# Patient Record
Sex: FEMALE | Race: WHITE | NOT HISPANIC OR LATINO | URBAN - METROPOLITAN AREA
[De-identification: names, ages, dates, MRNs, and addresses within clinical notes are randomized per-mention and may not be internally consistent; named-entity substitution may affect disease eponyms.]

---

## 2020-08-10 ENCOUNTER — LAB OUTSIDE AN ENCOUNTER (OUTPATIENT)
Dept: URBAN - METROPOLITAN AREA CLINIC 23 | Facility: CLINIC | Age: 45
End: 2020-08-10

## 2020-08-10 ENCOUNTER — OFFICE VISIT (OUTPATIENT)
Dept: URBAN - METROPOLITAN AREA CLINIC 23 | Facility: CLINIC | Age: 45
End: 2020-08-10
Payer: MEDICARE

## 2020-08-10 DIAGNOSIS — K50.812 CROHN'S DISEASE OF BOTH SMALL AND LARGE INTESTINE WITH INTESTINAL OBSTRUCTION: ICD-10-CM

## 2020-08-10 PROCEDURE — G9622 NO UNHEAL ETOH USER: HCPCS | Performed by: INTERNAL MEDICINE

## 2020-08-10 PROCEDURE — 99214 OFFICE O/P EST MOD 30 MIN: CPT | Performed by: INTERNAL MEDICINE

## 2020-08-10 PROCEDURE — G9903 PT SCRN TBCO ID AS NON USER: HCPCS | Performed by: INTERNAL MEDICINE

## 2020-08-10 PROCEDURE — G8420 CALC BMI NORM PARAMETERS: HCPCS | Performed by: INTERNAL MEDICINE

## 2020-08-10 PROCEDURE — 3017F COLORECTAL CA SCREEN DOC REV: CPT | Performed by: INTERNAL MEDICINE

## 2020-08-10 PROCEDURE — G8427 DOCREV CUR MEDS BY ELIG CLIN: HCPCS | Performed by: INTERNAL MEDICINE

## 2020-08-10 PROCEDURE — 1036F TOBACCO NON-USER: CPT | Performed by: INTERNAL MEDICINE

## 2020-08-10 RX ORDER — VARENICLINE TARTRATE 1 MG/1
TAKE 1 TABLET (1 MG) WITH GLASS OF WATER BY ORAL ROUTE 2 TIMES PER DAY AFTER MEALS TABLET, FILM COATED ORAL 2
Qty: 0 | Refills: 0 | Status: ACTIVE | COMMUNITY
Start: 1900-01-01 | End: 1900-01-01

## 2020-08-10 RX ORDER — FLUTICASONE PROPIONATE 50 UG/1
SPRAY 1 SPRAY (50 MCG) IN EACH NOSTRIL BY INTRANASAL ROUTE ONCE DAILY SPRAY, METERED NASAL 1
Qty: 1 | Refills: 0 | Status: ACTIVE | COMMUNITY
Start: 1900-01-01 | End: 1900-01-01

## 2020-08-10 RX ORDER — METRONIDAZOLE 500 MG/1
1 TABLET TABLET, FILM COATED ORAL THREE TIMES A DAY
Qty: 30 | OUTPATIENT
Start: 2020-08-10 | End: 2020-08-20

## 2020-08-10 RX ORDER — SULFASALAZINE 500 MG/1
TAKE 4 TABLETS (2000 MG) BY ORAL ROUTE 2 TIMES PER DAY AFTER MEALS TABLET ORAL
Qty: 240 | Refills: 6 | Status: ACTIVE | COMMUNITY
Start: 2019-01-28 | End: 1900-01-01

## 2020-08-10 RX ORDER — PREDNISONE 20 MG/1
TAKE 2 TABLETS (40 MG) BY ORAL ROUTE ONCE DAILY TABLET ORAL 1
Qty: 60 | Refills: 0 | Status: ACTIVE | COMMUNITY
Start: 2019-04-26 | End: 1900-01-01

## 2020-08-10 RX ORDER — CIPROFLOXACIN HCL 500 MG
1 TABLET TABLET ORAL
Qty: 20 | OUTPATIENT
Start: 2020-08-10 | End: 2020-08-20

## 2020-08-10 RX ORDER — LAMOTRIGINE 100 MG/1
TAKE 1 TABLET (100 MG) BY ORAL ROUTE ONCE DAILY TABLET ORAL 1
Qty: 0 | Refills: 0 | Status: ACTIVE | COMMUNITY
Start: 1900-01-01 | End: 1900-01-01

## 2020-08-10 RX ORDER — PANTOPRAZOLE SODIUM 40 MG/1
TAKE 1 TABLET (40 MG) BY ORAL ROUTE 2 TIMES PER DAY, 30 MINUTES BEFORE BREAKFAST AND DINNER TABLET, DELAYED RELEASE ORAL 2
Qty: 60 | Refills: 11 | Status: ACTIVE | COMMUNITY
Start: 2019-05-24 | End: 1900-01-01

## 2020-08-10 RX ORDER — CYCLOBENZAPRINE HYDROCHLORIDE 10 MG/1
TAKE 1 TABLET (10 MG) BY ORAL ROUTE 2 TIMES PER DAY TABLET, FILM COATED ORAL 2
Qty: 0 | Refills: 0 | Status: ACTIVE | COMMUNITY
Start: 1900-01-01 | End: 1900-01-01

## 2020-08-10 RX ORDER — ONDANSETRON HYDROCHLORIDE 4 MG/1
1 TABLET TABLET, FILM COATED ORAL
Qty: 30 | Refills: 1 | OUTPATIENT
Start: 2020-08-10

## 2020-08-10 RX ORDER — PAROXETINE HYDROCHLORIDE 30 MG/1
TAKE 1 TABLET (30 MG) BY ORAL ROUTE ONCE DAILY TABLET, FILM COATED ORAL 1
Qty: 0 | Refills: 0 | Status: ACTIVE | COMMUNITY
Start: 1900-01-01 | End: 1900-01-01

## 2020-08-10 NOTE — HPI-TODAY'S VISIT:
45-year-old  female with Crohn's disease, involving ileocolonic, complicated by stenosis, status post ileocolonic resection 2 years ago.  Currently not on any medical therapy.  She is blaming insurance coverage issues.  She presents today  with 1 month history of acute flare.  Multiple loose diarrhea, nausea vomiting, right-sided abdominal pain.

## 2020-08-10 NOTE — PREVIOUS WORKUP REVIEWED
:ENDOSCOPIES:-EGD 1/23/2019:Normal EGD.-Colonoscopy 1/23/2019:Pseudopolyps in the cecum. Inflammation in the ascending colon and the cecum consistent with Crohn's disease. Mild in severity. Scarring from Crohn's disease at the ileocecal valve. Precluded intubation of the terminal ileum. A 6 mm polyp in the transverse colon. Hemorrhoids.*Pathology:Duodenum-increased intraepithelial lymphocytes and mild villous blunting. Gastric random-mild chronic gastritis without H. pylori. Esophagus-mild chronic inflammation in the squamous epithelium. No intestinal metaplasia. No eosinophils. Transverse colon polyp-lymphoid aggregates. Cecum-lymphoid aggregates without evidence of active, chronic or microscopic colitis. No granulomata or dysplasia. Right colon-minimal active inflammation, compatible with history of Crohn's disease, no granulomata or dysplasia. Transverse colon-normal. No evidence of active chronic or microscopic colitis. No granulomata or dysplasia. Left colon-normal. No evidence of active, chronic or microscopic colitis. No greater matter or dysplasia. Rectum-normal. No evidence of active, chronic or microscopic colitis. No granulomata or dysplasia.LABS: IMAGES:-MRI pelvis 4/3/2019:Abnormal thickening of the terminal ileum without evidence of acute inflammation. Mild dilation of the distal ileum and evidence of small bowel stasis raising concern for stricture.

## 2020-08-12 LAB
A/G RATIO: 1.4
ALBUMIN: 3.4
ALKALINE PHOSPHATASE: 119
ALT (SGPT): 23
AST (SGOT): 20
BILIRUBIN, TOTAL: 0.3
BUN/CREATININE RATIO: 8
BUN: 6
C-REACTIVE PROTEIN, QUANT: 232
CALCIUM: 9.2
CARBON DIOXIDE, TOTAL: 22
CHLORIDE: 97
CREATININE: 0.77
EGFR IF AFRICN AM: 108
EGFR IF NONAFRICN AM: 94
GLOBULIN, TOTAL: 2.4
GLUCOSE: 132
HEMATOCRIT: 40.1
HEMOGLOBIN: 12.5
LIPASE: 9
MCH: 28.7
MCHC: 31.2
MCV: 92
NRBC: (no result)
PLATELETS: 360
POTASSIUM: 3.9
PROTEIN, TOTAL: 5.8
RBC: 4.36
RDW: 12.1
SEDIMENTATION RATE-WESTERGREN: 47
SODIUM: 138
WBC: 10.8

## 2020-08-13 ENCOUNTER — TELEPHONE ENCOUNTER (OUTPATIENT)
Dept: URBAN - METROPOLITAN AREA CLINIC 23 | Facility: CLINIC | Age: 45
End: 2020-08-13

## 2020-08-14 LAB — GASTROINTESTINAL PATHOGEN: (no result)

## 2020-08-17 ENCOUNTER — WEB ENCOUNTER (OUTPATIENT)
Dept: URBAN - METROPOLITAN AREA CLINIC 23 | Facility: CLINIC | Age: 45
End: 2020-08-17

## 2020-08-19 ENCOUNTER — WEB ENCOUNTER (OUTPATIENT)
Dept: URBAN - METROPOLITAN AREA CLINIC 23 | Facility: CLINIC | Age: 45
End: 2020-08-19

## 2020-08-20 ENCOUNTER — OFFICE VISIT (OUTPATIENT)
Dept: URBAN - METROPOLITAN AREA CLINIC 23 | Facility: CLINIC | Age: 45
End: 2020-08-20

## 2020-08-24 ENCOUNTER — WEB ENCOUNTER (OUTPATIENT)
Dept: URBAN - METROPOLITAN AREA CLINIC 23 | Facility: CLINIC | Age: 45
End: 2020-08-24

## 2020-09-16 ENCOUNTER — OFFICE VISIT (OUTPATIENT)
Dept: URBAN - METROPOLITAN AREA CLINIC 23 | Facility: CLINIC | Age: 45
End: 2020-09-16
Payer: MEDICARE

## 2020-09-16 VITALS
HEIGHT: 65 IN | TEMPERATURE: 97.1 F | WEIGHT: 109 LBS | SYSTOLIC BLOOD PRESSURE: 94 MMHG | BODY MASS INDEX: 18.16 KG/M2 | DIASTOLIC BLOOD PRESSURE: 60 MMHG | HEART RATE: 83 BPM

## 2020-09-16 DIAGNOSIS — R10.31 RLQ ABDOMINAL PAIN: ICD-10-CM

## 2020-09-16 DIAGNOSIS — K50.80 CROHN'S ILEOCOLITIS: ICD-10-CM

## 2020-09-16 DIAGNOSIS — R19.7 DIARRHEA: ICD-10-CM

## 2020-09-16 DIAGNOSIS — M19.90 ARTHRITIS: ICD-10-CM

## 2020-09-16 PROCEDURE — 74183 MRI ABD W/O CNTR FLWD CNTR: CPT | Performed by: INTERNAL MEDICINE

## 2020-09-16 PROCEDURE — 99214 OFFICE O/P EST MOD 30 MIN: CPT | Performed by: INTERNAL MEDICINE

## 2020-09-16 PROCEDURE — G8418 CALC BMI BLW LOW PARAM F/U: HCPCS | Performed by: INTERNAL MEDICINE

## 2020-09-16 PROCEDURE — G9903 PT SCRN TBCO ID AS NON USER: HCPCS | Performed by: INTERNAL MEDICINE

## 2020-09-16 PROCEDURE — 86480 TB TEST CELL IMMUN MEASURE: CPT | Performed by: INTERNAL MEDICINE

## 2020-09-16 PROCEDURE — 80074 ACUTE HEPATITIS PANEL: CPT | Performed by: INTERNAL MEDICINE

## 2020-09-16 PROCEDURE — 72197 MRI PELVIS W/O & W/DYE: CPT | Performed by: INTERNAL MEDICINE

## 2020-09-16 PROCEDURE — 83789 MASS SPECTROMETRY QUAL/QUAN: CPT | Performed by: INTERNAL MEDICINE

## 2020-09-16 PROCEDURE — G8427 DOCREV CUR MEDS BY ELIG CLIN: HCPCS | Performed by: INTERNAL MEDICINE

## 2020-09-16 RX ORDER — CYCLOBENZAPRINE HYDROCHLORIDE 10 MG/1
TAKE 1 TABLET (10 MG) BY ORAL ROUTE 2 TIMES PER DAY TABLET, FILM COATED ORAL 2
Qty: 0 | Refills: 0 | Status: ON HOLD | COMMUNITY
Start: 1900-01-01

## 2020-09-16 RX ORDER — LAMOTRIGINE 150 MG/1
1 TABLET TABLET ORAL BID
Refills: 0 | Status: ACTIVE | COMMUNITY
Start: 1900-01-01

## 2020-09-16 RX ORDER — PREDNISONE 10 MG/1
TAKE 2 TABLETS (40 MG) BY ORAL ROUTE ONCE DAILY TABLET ORAL 1
Refills: 0 | Status: ACTIVE | COMMUNITY
Start: 2019-04-26

## 2020-09-16 RX ORDER — FLUTICASONE PROPIONATE 50 UG/1
SPRAY 1 SPRAY (50 MCG) IN EACH NOSTRIL BY INTRANASAL ROUTE ONCE DAILY SPRAY, METERED NASAL 1
Qty: 1 | Refills: 0 | Status: ACTIVE | COMMUNITY
Start: 1900-01-01

## 2020-09-16 RX ORDER — PANTOPRAZOLE SODIUM 40 MG/1
TAKE 1 TABLET (40 MG) BY ORAL ROUTE 2 TIMES PER DAY, 30 MINUTES BEFORE BREAKFAST AND DINNER TABLET, DELAYED RELEASE ORAL 2
Qty: 60 | Refills: 11 | Status: ON HOLD | COMMUNITY
Start: 2019-05-24

## 2020-09-16 RX ORDER — ONDANSETRON HYDROCHLORIDE 4 MG/1
1 TABLET TABLET, FILM COATED ORAL
Qty: 30 | Refills: 1 | Status: ON HOLD | COMMUNITY
Start: 2020-08-10

## 2020-09-16 RX ORDER — VARENICLINE TARTRATE 1 MG/1
TAKE 1 TABLET (1 MG) WITH GLASS OF WATER BY ORAL ROUTE 2 TIMES PER DAY AFTER MEALS TABLET, FILM COATED ORAL 2
Qty: 0 | Refills: 0 | Status: ON HOLD | COMMUNITY
Start: 1900-01-01

## 2020-09-16 RX ORDER — AZATHIOPRINE 50 MG/1
1 TAB TABLET ORAL QD
Status: ACTIVE | COMMUNITY

## 2020-09-16 RX ORDER — TIZANIDINE 4 MG/1
1 TABLET TABLET ORAL THREE TIMES A DAY
Status: ACTIVE | COMMUNITY

## 2020-09-16 RX ORDER — HYDROXYZINE HYDROCHLORIDE 50 MG/ML
1 ML AS NEEDED INJECTION, SOLUTION INTRAMUSCULAR
Status: ACTIVE | COMMUNITY

## 2020-09-16 RX ORDER — SULFASALAZINE 500 MG/1
1 TABLET TABLET ORAL BID
Refills: 6 | Status: ACTIVE | COMMUNITY
Start: 2019-01-28

## 2020-09-16 RX ORDER — PAROXETINE HYDROCHLORIDE 30 MG/1
TAKE 1 TABLET (30 MG) BY ORAL ROUTE ONCE DAILY TABLET, FILM COATED ORAL 1
Qty: 0 | Refills: 0 | Status: ON HOLD | COMMUNITY
Start: 1900-01-01

## 2020-09-16 RX ORDER — TRAMADOL HYDROCHLORIDE 50 MG/1
1 TABLET AS NEEDED TABLET, FILM COATED ORAL QID
Status: ACTIVE | COMMUNITY

## 2020-09-16 RX ORDER — VENLAFAXINE HYDROCHLORIDE 75 MG/1
1 TABLET WITH FOOD TABLET ORAL ONCE A DAY
Status: ACTIVE | COMMUNITY

## 2020-09-16 NOTE — PHYSICAL EXAM CONSTITUTIONAL:
well developed, well nourished , in no acute distress , in a wheelchair, normal communication ability

## 2020-09-16 NOTE — HPI-TODAY'S VISIT:
- 44 yo female - Was seen by a rheumatologist (Dr. aMrilynn Bautista) yesterday, for arthritis of feet, ankles, wrists and elbows.  Patient cannot walk because of them; she is here in a wheelchair today.  Patient states her rheumatologist told her that her arthritis was related to her Crohn's disease.  States her rheumatologist prescribed sulfasalazine, azathioprine, and a prednisone taper.  States she will  these medications today. - She went to the ER at [facility] on August 18 with complaints of nausea, vomiting, and diarrhea.  Had negative bloodwork and negative abdominopelvic CT and was discharged home. - Bloodwork with Dr. Molina last month revealed elevated inflammatory markers (sed rate and CRP).  Stool PCR done last month was negative for infection. - Current bowel habits are 3-4 soft to loose stools per day.  Currently has RLQ abdominal pain.  Denies nausea or vomiting currently.

## 2020-09-29 ENCOUNTER — LAB OUTSIDE AN ENCOUNTER (OUTPATIENT)
Dept: URBAN - METROPOLITAN AREA CLINIC 23 | Facility: CLINIC | Age: 45
End: 2020-09-29

## 2020-10-02 ENCOUNTER — LAB OUTSIDE AN ENCOUNTER (OUTPATIENT)
Dept: URBAN - METROPOLITAN AREA CLINIC 23 | Facility: CLINIC | Age: 45
End: 2020-10-02

## 2020-10-07 LAB
CALPROTECTIN, STOOL - QDX: (no result)
GASTROINTESTINAL PATHOGEN: (no result)
PANCREATICELASTASE ELISA, STOOL: (no result)

## 2020-10-29 ENCOUNTER — TELEPHONE ENCOUNTER (OUTPATIENT)
Dept: URBAN - METROPOLITAN AREA CLINIC 23 | Facility: CLINIC | Age: 45
End: 2020-10-29

## 2020-10-30 ENCOUNTER — OFFICE VISIT (OUTPATIENT)
Dept: URBAN - METROPOLITAN AREA CLINIC 23 | Facility: CLINIC | Age: 45
End: 2020-10-30
Payer: MEDICARE

## 2020-10-30 ENCOUNTER — WEB ENCOUNTER (OUTPATIENT)
Dept: URBAN - METROPOLITAN AREA CLINIC 23 | Facility: CLINIC | Age: 45
End: 2020-10-30

## 2020-10-30 VITALS
BODY MASS INDEX: 18.29 KG/M2 | WEIGHT: 109.8 LBS | HEIGHT: 65 IN | HEART RATE: 84 BPM | TEMPERATURE: 96.9 F | DIASTOLIC BLOOD PRESSURE: 67 MMHG | SYSTOLIC BLOOD PRESSURE: 113 MMHG

## 2020-10-30 DIAGNOSIS — K50.80 CROHN'S DISEASE OF BOTH SMALL AND LARGE INTESTINE: ICD-10-CM

## 2020-10-30 DIAGNOSIS — R14.1 GAS PAIN: ICD-10-CM

## 2020-10-30 PROCEDURE — 99213 OFFICE O/P EST LOW 20 MIN: CPT | Performed by: INTERNAL MEDICINE

## 2020-10-30 RX ORDER — VENLAFAXINE HYDROCHLORIDE 75 MG/1
1 TABLET WITH FOOD TABLET ORAL ONCE A DAY
Status: ACTIVE | COMMUNITY

## 2020-10-30 RX ORDER — ONDANSETRON HYDROCHLORIDE 4 MG/1
1 TABLET TABLET, FILM COATED ORAL
Qty: 30 | Refills: 1 | Status: ON HOLD | COMMUNITY
Start: 2020-08-10

## 2020-10-30 RX ORDER — CYCLOBENZAPRINE HYDROCHLORIDE 10 MG/1
TAKE 1 TABLET (10 MG) BY ORAL ROUTE 2 TIMES PER DAY TABLET, FILM COATED ORAL 2
Qty: 0 | Refills: 0 | Status: ON HOLD | COMMUNITY
Start: 1900-01-01

## 2020-10-30 RX ORDER — HYDROXYZINE HYDROCHLORIDE 50 MG/ML
1 ML AS NEEDED INJECTION, SOLUTION INTRAMUSCULAR
Status: ACTIVE | COMMUNITY

## 2020-10-30 RX ORDER — LAMOTRIGINE 150 MG/1
1 TABLET TABLET ORAL BID
Refills: 0 | Status: ACTIVE | COMMUNITY
Start: 1900-01-01

## 2020-10-30 RX ORDER — VARENICLINE TARTRATE 1 MG/1
TAKE 1 TABLET (1 MG) WITH GLASS OF WATER BY ORAL ROUTE 2 TIMES PER DAY AFTER MEALS TABLET, FILM COATED ORAL 2
Qty: 0 | Refills: 0 | Status: ON HOLD | COMMUNITY
Start: 1900-01-01

## 2020-10-30 RX ORDER — PREDNISONE 10 MG/1
TAKE 2 TABLETS (40 MG) BY ORAL ROUTE ONCE DAILY TABLET ORAL 1
Refills: 0 | Status: ACTIVE | COMMUNITY
Start: 2019-04-26

## 2020-10-30 RX ORDER — TIZANIDINE 4 MG/1
1 TABLET TABLET ORAL THREE TIMES A DAY
Status: ACTIVE | COMMUNITY

## 2020-10-30 RX ORDER — PANTOPRAZOLE SODIUM 40 MG/1
TAKE 1 TABLET (40 MG) BY ORAL ROUTE 2 TIMES PER DAY, 30 MINUTES BEFORE BREAKFAST AND DINNER TABLET, DELAYED RELEASE ORAL 2
Qty: 60 | Refills: 11 | Status: ON HOLD | COMMUNITY
Start: 2019-05-24

## 2020-10-30 RX ORDER — AZATHIOPRINE 50 MG/1
1 TAB TABLET ORAL QD
Status: ACTIVE | COMMUNITY

## 2020-10-30 RX ORDER — TRAMADOL HYDROCHLORIDE 50 MG/1
1 TABLET AS NEEDED TABLET, FILM COATED ORAL QID
Status: ACTIVE | COMMUNITY

## 2020-10-30 RX ORDER — PAROXETINE HYDROCHLORIDE 30 MG/1
TAKE 1 TABLET (30 MG) BY ORAL ROUTE ONCE DAILY TABLET, FILM COATED ORAL 1
Qty: 0 | Refills: 0 | Status: ON HOLD | COMMUNITY
Start: 1900-01-01

## 2020-10-30 RX ORDER — FLUTICASONE PROPIONATE 50 UG/1
SPRAY 1 SPRAY (50 MCG) IN EACH NOSTRIL BY INTRANASAL ROUTE ONCE DAILY SPRAY, METERED NASAL 1
Qty: 1 | Refills: 0 | Status: ACTIVE | COMMUNITY
Start: 1900-01-01

## 2020-10-30 RX ORDER — SULFASALAZINE 500 MG/1
1 TABLET TABLET ORAL BID
Refills: 6 | Status: ACTIVE | COMMUNITY
Start: 2019-01-28

## 2020-10-30 NOTE — HPI-TODAY'S VISIT:
- 44 yo  female here for follow up - Recent fecal calprotectin was elevated at 162; stool GI PCR panel was negative for infectious pathogens.  Recent MR enterography revealed suggestion of distal ileitis. - States that the bloodwork which I ordered last month, she had this done through her rheumatologist 2 weeks ago.  Currently I do not have these results available for my review.   - As prescribed by her rheumatologist (Dr. Marilynn Bautista), she is currently taking prednisone 5 mg daily, sulfasalazine 500 mg bid, and azathioprine 50 mg daily  - States she is still experiencing lower abdominal cramping, bloating, and gas.  She is having diarrhea a few days per week.  No rectal bleeding.   Previous visit 9/16/20 - 44 yo female - Was seen by a rheumatologist (Dr. Marilynn Bautista) yesterday, for arthritis of feet, ankles, wrists and elbows.  Patient cannot walk because of them; she is here in a wheelchair today.  Patient states her rheumatologist told her that her arthritis was related to her Crohn's disease.  States her rheumatologist prescribed sulfasalazine, azathioprine, and a prednisone taper.  States she will  these medications today. - She went to the ER at [facility] on August 18 with complaints of nausea, vomiting, and diarrhea.  Had negative bloodwork and negative abdominopelvic CT and was discharged home. - Bloodwork with Dr. Molina last month revealed elevated inflammatory markers (sed rate and CRP).  Stool PCR done last month was negative for infection. - Current bowel habits are 3-4 soft to loose stools per day.  Currently has RLQ abdominal pain.  Denies nausea or vomiting currently.

## 2020-12-10 ENCOUNTER — WEB ENCOUNTER (OUTPATIENT)
Dept: URBAN - METROPOLITAN AREA CLINIC 23 | Facility: CLINIC | Age: 45
End: 2020-12-10

## 2021-01-16 ENCOUNTER — WEB ENCOUNTER (OUTPATIENT)
Dept: URBAN - METROPOLITAN AREA CLINIC 23 | Facility: CLINIC | Age: 46
End: 2021-01-16

## 2021-01-22 ENCOUNTER — TELEPHONE ENCOUNTER (OUTPATIENT)
Dept: URBAN - METROPOLITAN AREA CLINIC 78 | Facility: CLINIC | Age: 46
End: 2021-01-22

## 2021-01-29 ENCOUNTER — OFFICE VISIT (OUTPATIENT)
Dept: URBAN - METROPOLITAN AREA CLINIC 23 | Facility: CLINIC | Age: 46
End: 2021-01-29
Payer: MEDICARE

## 2021-01-29 DIAGNOSIS — R10.84 GENERALIZED ABDOMINAL PAIN: ICD-10-CM

## 2021-01-29 DIAGNOSIS — R63.6 UNDERWEIGHT: ICD-10-CM

## 2021-01-29 DIAGNOSIS — K50.819 CROHN'S DISEASE OF BOTH SMALL AND LARGE INTESTINE WITH COMPLICATION: ICD-10-CM

## 2021-01-29 PROCEDURE — 1036F TOBACCO NON-USER: CPT | Performed by: INTERNAL MEDICINE

## 2021-01-29 PROCEDURE — G8483 FLU IMM NO ADMIN DOC REA: HCPCS | Performed by: INTERNAL MEDICINE

## 2021-01-29 PROCEDURE — 99215 OFFICE O/P EST HI 40 MIN: CPT | Performed by: INTERNAL MEDICINE

## 2021-01-29 PROCEDURE — G8427 DOCREV CUR MEDS BY ELIG CLIN: HCPCS | Performed by: INTERNAL MEDICINE

## 2021-01-29 PROCEDURE — G9906 PT RECV TBCO CESS INTERV: HCPCS | Performed by: INTERNAL MEDICINE

## 2021-01-29 PROCEDURE — G8418 CALC BMI BLW LOW PARAM F/U: HCPCS | Performed by: INTERNAL MEDICINE

## 2021-01-29 RX ORDER — AZATHIOPRINE 50 MG/1
1 TAB TABLET ORAL QD
Status: ACTIVE | COMMUNITY

## 2021-01-29 RX ORDER — VENLAFAXINE HYDROCHLORIDE 75 MG/1
1 TABLET WITH FOOD TABLET ORAL ONCE A DAY
Status: ACTIVE | COMMUNITY

## 2021-01-29 RX ORDER — TIZANIDINE 4 MG/1
1 TABLET TABLET ORAL THREE TIMES A DAY
Status: ACTIVE | COMMUNITY

## 2021-01-29 RX ORDER — PREDNISONE 10 MG/1
TAKE 2 TABLETS (40 MG) BY ORAL ROUTE ONCE DAILY TABLET ORAL 1
Refills: 0 | Status: ACTIVE | COMMUNITY
Start: 2019-04-26

## 2021-01-29 RX ORDER — PAROXETINE HYDROCHLORIDE 30 MG/1
TAKE 1 TABLET (30 MG) BY ORAL ROUTE ONCE DAILY TABLET, FILM COATED ORAL 1
Qty: 0 | Refills: 0 | Status: ON HOLD | COMMUNITY
Start: 1900-01-01

## 2021-01-29 RX ORDER — PANTOPRAZOLE SODIUM 40 MG/1
TAKE 1 TABLET (40 MG) BY ORAL ROUTE 2 TIMES PER DAY, 30 MINUTES BEFORE BREAKFAST AND DINNER TABLET, DELAYED RELEASE ORAL 2
Qty: 60 | Refills: 11 | Status: ON HOLD | COMMUNITY
Start: 2019-05-24

## 2021-01-29 RX ORDER — FLUTICASONE PROPIONATE 50 UG/1
SPRAY 1 SPRAY (50 MCG) IN EACH NOSTRIL BY INTRANASAL ROUTE ONCE DAILY SPRAY, METERED NASAL 1
Qty: 1 | Refills: 0 | Status: ACTIVE | COMMUNITY
Start: 1900-01-01

## 2021-01-29 RX ORDER — CYCLOBENZAPRINE HYDROCHLORIDE 10 MG/1
TAKE 1 TABLET (10 MG) BY ORAL ROUTE 2 TIMES PER DAY TABLET, FILM COATED ORAL 2
Qty: 0 | Refills: 0 | Status: ON HOLD | COMMUNITY
Start: 1900-01-01

## 2021-01-29 RX ORDER — USTEKINUMAB 90 MG/ML
INJECT 90 MG SUBCUTANEOUSLY EVERY 8 WEEKS; BEGIN MAINTENANCE DOSING 8 WEEKS AFTER THE IV INDUCTION DOSE INJECTION, SOLUTION SUBCUTANEOUS
Qty: 1 | Refills: 6 | OUTPATIENT

## 2021-01-29 RX ORDER — SULFASALAZINE 500 MG/1
1 TABLET TABLET ORAL BID
Refills: 6 | Status: ACTIVE | COMMUNITY
Start: 2019-01-28

## 2021-01-29 RX ORDER — TRAMADOL HYDROCHLORIDE 50 MG/1
1 TABLET AS NEEDED TABLET, FILM COATED ORAL QID
Status: ACTIVE | COMMUNITY

## 2021-01-29 RX ORDER — VARENICLINE TARTRATE 1 MG/1
TAKE 1 TABLET (1 MG) WITH GLASS OF WATER BY ORAL ROUTE 2 TIMES PER DAY AFTER MEALS TABLET, FILM COATED ORAL 2
Qty: 0 | Refills: 0 | Status: ON HOLD | COMMUNITY
Start: 1900-01-01

## 2021-01-29 RX ORDER — ONDANSETRON HYDROCHLORIDE 4 MG/1
1 TABLET TABLET, FILM COATED ORAL
Qty: 30 | Refills: 1 | Status: ON HOLD | COMMUNITY
Start: 2020-08-10

## 2021-01-29 RX ORDER — LAMOTRIGINE 150 MG/1
1 TABLET TABLET ORAL BID
Refills: 0 | Status: ACTIVE | COMMUNITY
Start: 1900-01-01

## 2021-01-29 RX ORDER — HYDROXYZINE HYDROCHLORIDE 50 MG/ML
1 ML AS NEEDED INJECTION, SOLUTION INTRAMUSCULAR
Status: ACTIVE | COMMUNITY

## 2021-01-29 NOTE — HPI-TODAY'S VISIT:
- 44 yo  female who returns for follow-up of Crohn's ileocolitis - She still has the same GI symptoms she previously described below.  Her prednisone was recently increased by her rheumatologist, to 10 mg daily.  Patient otherwise continues to take the same medications noted previously. - She has not been able to quit smoking  - We received recent labs done through patient's rheumatologist.  Pertinent findings included negative quantiferon gold test and negative acute viral hepatitis panel.  Previous visit 10/30/2020:  - 44 yo  female here for follow up - Recent fecal calprotectin was elevated at 162; stool GI PCR panel was negative for infectious pathogens.  Recent MR enterography revealed suggestion of distal ileitis. - States that the bloodwork which I ordered last month, she had this done through her rheumatologist 2 weeks ago.  Currently I do not have these results available for my review.   - As prescribed by her rheumatologist (Dr. Marilynn Bautista), she is currently taking prednisone 5 mg daily, sulfasalazine 500 mg bid, and azathioprine 50 mg daily  - States she is still experiencing lower abdominal cramping, bloating, and gas.  She is having diarrhea a few days per week.  No rectal bleeding.  Previous visit 9/16/20:  - 44 yo  female who returns for follow-up - Was seen by a rheumatologist (Dr. Marilynn Bautista) yesterday, for arthritis of feet, ankles, wrists and elbows.  Patient cannot walk because of them; she is here in a wheelchair today.  Patient states her rheumatologist told her that her arthritis was related to her Crohn's disease.  States her rheumatologist prescribed sulfasalazine, azathioprine, and a prednisone taper.  States she will  these medications today. - She went to the ER at [facility] on August 18 with complaints of nausea, vomiting, and diarrhea.  Had negative bloodwork and negative abdominopelvic CT and was discharged home. - Bloodwork with Dr. Molina last month revealed elevated inflammatory markers (sed rate and CRP).  Stool PCR done last month was negative for infection. - Current bowel habits are 3-4 soft to loose stools per day.  Currently has RLQ abdominal pain.  Denies nausea or vomiting currently.

## 2021-01-30 LAB
HEP A AB, TOTAL: POSITIVE
HEP B CORE AB, TOT: NEGATIVE
HEP B SURFACE AB, QUAL: NON REACTIVE

## 2021-01-31 PROBLEM — 56689002 CROHN'S DISEASE OF SMALL INTESTINE: Status: ACTIVE | Noted: 2021-01-29

## 2021-02-05 ENCOUNTER — LAB OUTSIDE AN ENCOUNTER (OUTPATIENT)
Dept: URBAN - METROPOLITAN AREA CLINIC 23 | Facility: CLINIC | Age: 46
End: 2021-02-05

## 2021-02-10 ENCOUNTER — TELEPHONE ENCOUNTER (OUTPATIENT)
Dept: URBAN - METROPOLITAN AREA CLINIC 78 | Facility: CLINIC | Age: 46
End: 2021-02-10

## 2021-02-10 RX ORDER — USTEKINUMAB 90 MG/ML
INJECT 90 MG SUBCUTANEOUSLY EVERY 8 WEEKS; BEGIN MAINTENANCE DOSING 8 WEEKS AFTER THE IV INDUCTION DOSE INJECTION, SOLUTION SUBCUTANEOUS
Qty: 1 | Refills: 6 | Status: ACTIVE | COMMUNITY

## 2021-02-10 RX ORDER — PANTOPRAZOLE SODIUM 40 MG/1
TAKE 1 TABLET (40 MG) BY ORAL ROUTE 2 TIMES PER DAY, 30 MINUTES BEFORE BREAKFAST AND DINNER TABLET, DELAYED RELEASE ORAL 2
Qty: 60 | Refills: 11 | Status: ON HOLD | COMMUNITY
Start: 2019-05-24

## 2021-02-10 RX ORDER — ONDANSETRON HYDROCHLORIDE 4 MG/1
1 TABLET TABLET, FILM COATED ORAL
Qty: 30 | Refills: 1 | Status: ON HOLD | COMMUNITY
Start: 2020-08-10

## 2021-02-10 RX ORDER — CYCLOBENZAPRINE HYDROCHLORIDE 10 MG/1
TAKE 1 TABLET (10 MG) BY ORAL ROUTE 2 TIMES PER DAY TABLET, FILM COATED ORAL 2
Qty: 0 | Refills: 0 | Status: ON HOLD | COMMUNITY
Start: 1900-01-01

## 2021-02-10 RX ORDER — LAMOTRIGINE 150 MG/1
1 TABLET TABLET ORAL BID
Refills: 0 | Status: ACTIVE | COMMUNITY
Start: 1900-01-01

## 2021-02-10 RX ORDER — TRAMADOL HYDROCHLORIDE 50 MG/1
1 TABLET AS NEEDED TABLET, FILM COATED ORAL QID
Status: ACTIVE | COMMUNITY

## 2021-02-10 RX ORDER — TIZANIDINE 4 MG/1
1 TABLET TABLET ORAL THREE TIMES A DAY
Status: ACTIVE | COMMUNITY

## 2021-02-10 RX ORDER — FLUTICASONE PROPIONATE 50 UG/1
SPRAY 1 SPRAY (50 MCG) IN EACH NOSTRIL BY INTRANASAL ROUTE ONCE DAILY SPRAY, METERED NASAL 1
Qty: 1 | Refills: 0 | Status: ACTIVE | COMMUNITY
Start: 1900-01-01

## 2021-02-10 RX ORDER — HYDROXYZINE HYDROCHLORIDE 50 MG/ML
1 ML AS NEEDED INJECTION, SOLUTION INTRAMUSCULAR
Status: ACTIVE | COMMUNITY

## 2021-02-10 RX ORDER — VARENICLINE TARTRATE 1 MG/1
TAKE 1 TABLET (1 MG) WITH GLASS OF WATER BY ORAL ROUTE 2 TIMES PER DAY AFTER MEALS TABLET, FILM COATED ORAL 2
Qty: 0 | Refills: 0 | Status: ON HOLD | COMMUNITY
Start: 1900-01-01

## 2021-02-10 RX ORDER — PREDNISONE 10 MG/1
TAKE 2 TABLETS (40 MG) BY ORAL ROUTE ONCE DAILY TABLET ORAL 1
Refills: 0 | Status: ACTIVE | COMMUNITY
Start: 2019-04-26

## 2021-02-10 RX ORDER — SULFASALAZINE 500 MG/1
1 TABLET TABLET ORAL BID
Refills: 6 | Status: ACTIVE | COMMUNITY
Start: 2019-01-28

## 2021-02-10 RX ORDER — PAROXETINE HYDROCHLORIDE 30 MG/1
TAKE 1 TABLET (30 MG) BY ORAL ROUTE ONCE DAILY TABLET, FILM COATED ORAL 1
Qty: 0 | Refills: 0 | Status: ON HOLD | COMMUNITY
Start: 1900-01-01

## 2021-02-10 RX ORDER — AZATHIOPRINE 50 MG/1
1 TAB TABLET ORAL QD
Status: ACTIVE | COMMUNITY

## 2021-02-10 RX ORDER — VENLAFAXINE HYDROCHLORIDE 75 MG/1
1 TABLET WITH FOOD TABLET ORAL ONCE A DAY
Status: ACTIVE | COMMUNITY

## 2021-02-16 ENCOUNTER — OFFICE VISIT (OUTPATIENT)
Dept: URBAN - METROPOLITAN AREA CLINIC 77 | Facility: CLINIC | Age: 46
End: 2021-02-16
Payer: MEDICARE

## 2021-02-16 DIAGNOSIS — K50.80 CROHN'S COLITIS: ICD-10-CM

## 2021-02-16 PROCEDURE — 96365 THER/PROPH/DIAG IV INF INIT: CPT | Performed by: INTERNAL MEDICINE

## 2021-02-16 RX ORDER — LAMOTRIGINE 150 MG/1
1 TABLET TABLET ORAL BID
Refills: 0 | Status: ACTIVE | COMMUNITY
Start: 1900-01-01

## 2021-02-16 RX ORDER — VENLAFAXINE HYDROCHLORIDE 75 MG/1
1 TABLET WITH FOOD TABLET ORAL ONCE A DAY
Status: ACTIVE | COMMUNITY

## 2021-02-16 RX ORDER — VARENICLINE TARTRATE 1 MG/1
TAKE 1 TABLET (1 MG) WITH GLASS OF WATER BY ORAL ROUTE 2 TIMES PER DAY AFTER MEALS TABLET, FILM COATED ORAL 2
Qty: 0 | Refills: 0 | Status: ON HOLD | COMMUNITY
Start: 1900-01-01

## 2021-02-16 RX ORDER — TRAMADOL HYDROCHLORIDE 50 MG/1
1 TABLET AS NEEDED TABLET, FILM COATED ORAL QID
Status: ACTIVE | COMMUNITY

## 2021-02-16 RX ORDER — ONDANSETRON HYDROCHLORIDE 4 MG/1
1 TABLET TABLET, FILM COATED ORAL
Qty: 30 | Refills: 1 | Status: ON HOLD | COMMUNITY
Start: 2020-08-10

## 2021-02-16 RX ORDER — SULFASALAZINE 500 MG/1
1 TABLET TABLET ORAL BID
Refills: 6 | Status: ACTIVE | COMMUNITY
Start: 2019-01-28

## 2021-02-16 RX ORDER — AZATHIOPRINE 50 MG/1
1 TAB TABLET ORAL QD
Status: ACTIVE | COMMUNITY

## 2021-02-16 RX ORDER — TIZANIDINE 4 MG/1
1 TABLET TABLET ORAL THREE TIMES A DAY
Status: ACTIVE | COMMUNITY

## 2021-02-16 RX ORDER — PANTOPRAZOLE SODIUM 40 MG/1
TAKE 1 TABLET (40 MG) BY ORAL ROUTE 2 TIMES PER DAY, 30 MINUTES BEFORE BREAKFAST AND DINNER TABLET, DELAYED RELEASE ORAL 2
Qty: 60 | Refills: 11 | Status: ON HOLD | COMMUNITY
Start: 2019-05-24

## 2021-02-16 RX ORDER — PAROXETINE HYDROCHLORIDE 30 MG/1
TAKE 1 TABLET (30 MG) BY ORAL ROUTE ONCE DAILY TABLET, FILM COATED ORAL 1
Qty: 0 | Refills: 0 | Status: ON HOLD | COMMUNITY
Start: 1900-01-01

## 2021-02-16 RX ORDER — CYCLOBENZAPRINE HYDROCHLORIDE 10 MG/1
TAKE 1 TABLET (10 MG) BY ORAL ROUTE 2 TIMES PER DAY TABLET, FILM COATED ORAL 2
Qty: 0 | Refills: 0 | Status: ON HOLD | COMMUNITY
Start: 1900-01-01

## 2021-02-16 RX ORDER — FLUTICASONE PROPIONATE 50 UG/1
SPRAY 1 SPRAY (50 MCG) IN EACH NOSTRIL BY INTRANASAL ROUTE ONCE DAILY SPRAY, METERED NASAL 1
Qty: 1 | Refills: 0 | Status: ACTIVE | COMMUNITY
Start: 1900-01-01

## 2021-02-16 RX ORDER — USTEKINUMAB 90 MG/ML
INJECT 90 MG SUBCUTANEOUSLY EVERY 8 WEEKS; BEGIN MAINTENANCE DOSING 8 WEEKS AFTER THE IV INDUCTION DOSE INJECTION, SOLUTION SUBCUTANEOUS
Qty: 1 | Refills: 6 | Status: ACTIVE | COMMUNITY

## 2021-02-16 RX ORDER — PREDNISONE 10 MG/1
TAKE 2 TABLETS (40 MG) BY ORAL ROUTE ONCE DAILY TABLET ORAL 1
Refills: 0 | Status: ACTIVE | COMMUNITY
Start: 2019-04-26

## 2021-02-16 RX ORDER — HYDROXYZINE HYDROCHLORIDE 50 MG/ML
1 ML AS NEEDED INJECTION, SOLUTION INTRAMUSCULAR
Status: ACTIVE | COMMUNITY

## 2021-05-06 ENCOUNTER — OFFICE VISIT (OUTPATIENT)
Dept: URBAN - METROPOLITAN AREA CLINIC 23 | Facility: CLINIC | Age: 46
End: 2021-05-06

## 2021-05-06 RX ORDER — FLUTICASONE PROPIONATE 50 UG/1
SPRAY 1 SPRAY (50 MCG) IN EACH NOSTRIL BY INTRANASAL ROUTE ONCE DAILY SPRAY, METERED NASAL 1
Qty: 1 | Refills: 0 | COMMUNITY
Start: 1900-01-01

## 2021-05-06 RX ORDER — ONDANSETRON HYDROCHLORIDE 4 MG/1
1 TABLET TABLET, FILM COATED ORAL
Qty: 30 | Refills: 1 | COMMUNITY
Start: 2020-08-10

## 2021-05-06 RX ORDER — CYCLOBENZAPRINE HYDROCHLORIDE 10 MG/1
TAKE 1 TABLET (10 MG) BY ORAL ROUTE 2 TIMES PER DAY TABLET, FILM COATED ORAL 2
Qty: 0 | Refills: 0 | COMMUNITY
Start: 1900-01-01

## 2021-05-06 RX ORDER — VENLAFAXINE HYDROCHLORIDE 75 MG/1
1 TABLET WITH FOOD TABLET ORAL ONCE A DAY
COMMUNITY

## 2021-05-06 RX ORDER — LAMOTRIGINE 150 MG/1
1 TABLET TABLET ORAL BID
Refills: 0 | COMMUNITY
Start: 1900-01-01

## 2021-05-06 RX ORDER — TRAMADOL HYDROCHLORIDE 50 MG/1
1 TABLET AS NEEDED TABLET, FILM COATED ORAL QID
COMMUNITY

## 2021-05-06 RX ORDER — PREDNISONE 10 MG/1
TAKE 2 TABLETS (40 MG) BY ORAL ROUTE ONCE DAILY TABLET ORAL 1
Refills: 0 | COMMUNITY
Start: 2019-04-26

## 2021-05-06 RX ORDER — PAROXETINE HYDROCHLORIDE 30 MG/1
TAKE 1 TABLET (30 MG) BY ORAL ROUTE ONCE DAILY TABLET, FILM COATED ORAL 1
Qty: 0 | Refills: 0 | COMMUNITY
Start: 1900-01-01

## 2021-05-06 RX ORDER — AZATHIOPRINE 50 MG/1
1 TAB TABLET ORAL QD
COMMUNITY

## 2021-05-06 RX ORDER — HYDROXYZINE HYDROCHLORIDE 50 MG/ML
1 ML AS NEEDED INJECTION, SOLUTION INTRAMUSCULAR
COMMUNITY

## 2021-05-06 RX ORDER — TIZANIDINE 4 MG/1
1 TABLET TABLET ORAL THREE TIMES A DAY
COMMUNITY

## 2021-05-06 RX ORDER — USTEKINUMAB 90 MG/ML
INJECT 90 MG SUBCUTANEOUSLY EVERY 8 WEEKS; BEGIN MAINTENANCE DOSING 8 WEEKS AFTER THE IV INDUCTION DOSE INJECTION, SOLUTION SUBCUTANEOUS
Qty: 1 | Refills: 6 | COMMUNITY

## 2021-05-06 RX ORDER — PANTOPRAZOLE SODIUM 40 MG/1
TAKE 1 TABLET (40 MG) BY ORAL ROUTE 2 TIMES PER DAY, 30 MINUTES BEFORE BREAKFAST AND DINNER TABLET, DELAYED RELEASE ORAL 2
Qty: 60 | Refills: 11 | COMMUNITY
Start: 2019-05-24

## 2021-05-06 RX ORDER — SULFASALAZINE 500 MG/1
1 TABLET TABLET ORAL BID
Refills: 6 | COMMUNITY
Start: 2019-01-28

## 2021-05-06 RX ORDER — VARENICLINE TARTRATE 1 MG/1
TAKE 1 TABLET (1 MG) WITH GLASS OF WATER BY ORAL ROUTE 2 TIMES PER DAY AFTER MEALS TABLET, FILM COATED ORAL 2
Qty: 0 | Refills: 0 | COMMUNITY
Start: 1900-01-01

## 2021-05-14 ENCOUNTER — WEB ENCOUNTER (OUTPATIENT)
Dept: URBAN - METROPOLITAN AREA CLINIC 23 | Facility: CLINIC | Age: 46
End: 2021-05-14

## 2021-05-28 ENCOUNTER — OFFICE VISIT (OUTPATIENT)
Dept: URBAN - METROPOLITAN AREA CLINIC 23 | Facility: CLINIC | Age: 46
End: 2021-05-28

## 2021-07-06 ENCOUNTER — OFFICE VISIT (OUTPATIENT)
Dept: URBAN - METROPOLITAN AREA CLINIC 23 | Facility: CLINIC | Age: 46
End: 2021-07-06
Payer: MEDICARE

## 2021-07-06 ENCOUNTER — LAB OUTSIDE AN ENCOUNTER (OUTPATIENT)
Dept: URBAN - METROPOLITAN AREA CLINIC 23 | Facility: CLINIC | Age: 46
End: 2021-07-06

## 2021-07-06 ENCOUNTER — DASHBOARD ENCOUNTERS (OUTPATIENT)
Age: 46
End: 2021-07-06

## 2021-07-06 VITALS
HEART RATE: 89 BPM | TEMPERATURE: 97.5 F | WEIGHT: 118 LBS | SYSTOLIC BLOOD PRESSURE: 118 MMHG | BODY MASS INDEX: 19.66 KG/M2 | DIASTOLIC BLOOD PRESSURE: 81 MMHG | HEIGHT: 65 IN

## 2021-07-06 DIAGNOSIS — R12 HEARTBURN: ICD-10-CM

## 2021-07-06 DIAGNOSIS — R10.13 DYSPEPSIA: ICD-10-CM

## 2021-07-06 DIAGNOSIS — R19.7 DIARRHEA: ICD-10-CM

## 2021-07-06 DIAGNOSIS — K50.819 CROHN'S DISEASE OF BOTH SMALL AND LARGE INTESTINE WITH COMPLICATION: ICD-10-CM

## 2021-07-06 DIAGNOSIS — Z78.9 IMMUNE TO HEPATITIS A: ICD-10-CM

## 2021-07-06 DIAGNOSIS — R10.9 ABDOMINAL PAIN OF MULTIPLE SITES: ICD-10-CM

## 2021-07-06 DIAGNOSIS — Z23 NEED FOR HEPATITIS B VACCINATION: ICD-10-CM

## 2021-07-06 DIAGNOSIS — F17.210 CIGARETTE SMOKER: ICD-10-CM

## 2021-07-06 PROBLEM — 65568007 CIGARETTE SMOKER: Status: ACTIVE | Noted: 2021-07-06

## 2021-07-06 PROCEDURE — 99214 OFFICE O/P EST MOD 30 MIN: CPT | Performed by: INTERNAL MEDICINE

## 2021-07-06 RX ORDER — CYCLOBENZAPRINE HYDROCHLORIDE 10 MG/1
TAKE 1 TABLET (10 MG) BY ORAL ROUTE 2 TIMES PER DAY TABLET, FILM COATED ORAL 2
Qty: 0 | Refills: 0 | Status: ON HOLD | COMMUNITY
Start: 1900-01-01

## 2021-07-06 RX ORDER — PAROXETINE HYDROCHLORIDE 30 MG/1
TAKE 1 TABLET (30 MG) BY ORAL ROUTE ONCE DAILY TABLET, FILM COATED ORAL 1
Qty: 0 | Refills: 0 | Status: ON HOLD | COMMUNITY
Start: 1900-01-01

## 2021-07-06 RX ORDER — PREDNISONE 10 MG/1
TAKE 2 TABLETS (40 MG) BY ORAL ROUTE ONCE DAILY TABLET ORAL 1
Refills: 0 | Status: ON HOLD | COMMUNITY
Start: 2019-04-26

## 2021-07-06 RX ORDER — TRAMADOL HYDROCHLORIDE 50 MG/1
1 TABLET AS NEEDED TABLET, FILM COATED ORAL QID
Status: ON HOLD | COMMUNITY

## 2021-07-06 RX ORDER — AZATHIOPRINE 50 MG/1
1 TAB TABLET ORAL QD
Status: ON HOLD | COMMUNITY

## 2021-07-06 RX ORDER — VARENICLINE TARTRATE 1 MG/1
TAKE 1 TABLET (1 MG) WITH GLASS OF WATER BY ORAL ROUTE 2 TIMES PER DAY AFTER MEALS TABLET, FILM COATED ORAL 2
Qty: 0 | Refills: 0 | Status: ON HOLD | COMMUNITY
Start: 1900-01-01

## 2021-07-06 RX ORDER — LAMOTRIGINE 150 MG/1
1 TABLET TABLET ORAL BID
Refills: 0 | Status: ON HOLD | COMMUNITY
Start: 1900-01-01

## 2021-07-06 RX ORDER — SULFASALAZINE 500 MG/1
1 TABLET TABLET ORAL BID
Refills: 6 | Status: ON HOLD | COMMUNITY
Start: 2019-01-28

## 2021-07-06 RX ORDER — FLUTICASONE PROPIONATE 50 UG/1
SPRAY 1 SPRAY (50 MCG) IN EACH NOSTRIL BY INTRANASAL ROUTE ONCE DAILY SPRAY, METERED NASAL 1
Qty: 1 | Refills: 0 | Status: ON HOLD | COMMUNITY
Start: 1900-01-01

## 2021-07-06 RX ORDER — TIZANIDINE 4 MG/1
1 TABLET TABLET ORAL THREE TIMES A DAY
Status: ACTIVE | COMMUNITY

## 2021-07-06 RX ORDER — MONTELUKAST 10 MG/1
1 TABLET TABLET, FILM COATED ORAL ONCE A DAY
Status: ACTIVE | COMMUNITY

## 2021-07-06 RX ORDER — VENLAFAXINE HYDROCHLORIDE 75 MG/1
1 TABLET WITH FOOD TABLET ORAL ONCE A DAY
Status: ACTIVE | COMMUNITY

## 2021-07-06 RX ORDER — ONDANSETRON HYDROCHLORIDE 4 MG/1
1 TABLET TABLET, FILM COATED ORAL
Qty: 30 | Refills: 1 | Status: ON HOLD | COMMUNITY
Start: 2020-08-10

## 2021-07-06 RX ORDER — USTEKINUMAB 90 MG/ML
INJECT 90 MG SUBCUTANEOUSLY EVERY 8 WEEKS; BEGIN MAINTENANCE DOSING 8 WEEKS AFTER THE IV INDUCTION DOSE INJECTION, SOLUTION SUBCUTANEOUS
Qty: 1 | Refills: 6 | Status: ACTIVE | COMMUNITY

## 2021-07-06 RX ORDER — HYDROXYZINE HYDROCHLORIDE 50 MG/ML
1 ML AS NEEDED INJECTION, SOLUTION INTRAMUSCULAR
Status: ACTIVE | COMMUNITY

## 2021-07-06 RX ORDER — PANTOPRAZOLE SODIUM 40 MG/1
TAKE 1 TABLET (40 MG) BY ORAL ROUTE 2 TIMES PER DAY, 30 MINUTES BEFORE BREAKFAST AND DINNER TABLET, DELAYED RELEASE ORAL 2
Qty: 60 | Refills: 11 | Status: ON HOLD | COMMUNITY
Start: 2019-05-24

## 2021-07-06 NOTE — HPI-TODAY'S VISIT:
- 45 yo  female who returns for follow-up of Crohns ileocolitis - Today she complains of diarrhea alternating with constipation, as well as diffuse, crampy abdominal pains and worsening acid reflux.  States the abdominal pains are the worst and that it hurts more after eating.  Denies rectal bleeding.  States she is only taking Tums or Pepto Bismol as needed for her acid reflux. - She started Stelara in 2/2021.  She is doing her injections every other month.  She states she is being compliant. - She saw her rheumatologist last time in 4/2021 and will be seeing her again next month - States she saw a Dermatologist in 5/2021 and that her annual skin check was OK - She is immune to hepatitis A but not hepatitis B.  States she will be starting hepatitis B vaccinations through her PCPs office in the near future. - She has not yet been able to quit smoking.  She smokes about half a pack of cigarettes per day. - She had negative bloodwork done at PCPs office a couple of months ago

## 2021-07-21 ENCOUNTER — LAB OUTSIDE AN ENCOUNTER (OUTPATIENT)
Dept: URBAN - METROPOLITAN AREA CLINIC 23 | Facility: CLINIC | Age: 46
End: 2021-07-21

## 2021-07-29 NOTE — PHYSICAL EXAM NEUROLOGIC:
F/u after MRI  MRI- Lumbar spine, lower back pain, radiculopathy  3 months of therapy  XR shows DJD  Icing/heat/OTC pain meds as needed  Icing/heat/OTC pain meds as needed  Continue home exercises  oriented to person, place and time , normal sensation , short and long term memory intact

## 2021-09-24 ENCOUNTER — OFFICE VISIT (OUTPATIENT)
Dept: URBAN - METROPOLITAN AREA SURGERY CENTER 15 | Facility: SURGERY CENTER | Age: 46
End: 2021-09-24

## 2021-11-04 PROBLEM — 71833008 CROHN'S DISEASE OF SMALL AND LARGE INTESTINES: Status: ACTIVE | Noted: 2021-01-31

## 2021-11-12 ENCOUNTER — OFFICE VISIT (OUTPATIENT)
Dept: URBAN - METROPOLITAN AREA SURGERY CENTER 15 | Facility: SURGERY CENTER | Age: 46
End: 2021-11-12
Payer: MEDICARE

## 2021-11-12 ENCOUNTER — CLAIMS CREATED FROM THE CLAIM WINDOW (OUTPATIENT)
Dept: URBAN - METROPOLITAN AREA CLINIC 4 | Facility: CLINIC | Age: 46
End: 2021-11-12
Payer: MEDICARE

## 2021-11-12 ENCOUNTER — TELEPHONE ENCOUNTER (OUTPATIENT)
Dept: URBAN - METROPOLITAN AREA CLINIC 92 | Facility: CLINIC | Age: 46
End: 2021-11-12

## 2021-11-12 DIAGNOSIS — K63.89 POLYP, HYPERPLASTIC: ICD-10-CM

## 2021-11-12 DIAGNOSIS — K50.00 CROHN'S DISEASE OF SMALL INTESTINE WITHOUT COMPLICATIONS: ICD-10-CM

## 2021-11-12 DIAGNOSIS — K50.80 CROHN'S COLITIS: ICD-10-CM

## 2021-11-12 DIAGNOSIS — K21.9 GASTRO-ESOPHAGEAL REFLUX DISEASE WITHOUT ESOPHAGITIS: ICD-10-CM

## 2021-11-12 DIAGNOSIS — K31.89 DEFORMED PYLORUS, ACQUIRED: ICD-10-CM

## 2021-11-12 DIAGNOSIS — K21.9 ACID REFLUX: ICD-10-CM

## 2021-11-12 PROBLEM — 40719004 EROSIVE ESOPHAGITIS: Status: ACTIVE | Noted: 2021-11-12

## 2021-11-12 PROCEDURE — 44386 ENDOSCOPY BOWEL POUCH/BIOP: CPT | Performed by: INTERNAL MEDICINE

## 2021-11-12 PROCEDURE — 88342 IMHCHEM/IMCYTCHM 1ST ANTB: CPT | Performed by: PATHOLOGY

## 2021-11-12 PROCEDURE — 88341 IMHCHEM/IMCYTCHM EA ADD ANTB: CPT | Performed by: PATHOLOGY

## 2021-11-12 PROCEDURE — G8907 PT DOC NO EVENTS ON DISCHARG: HCPCS | Performed by: INTERNAL MEDICINE

## 2021-11-12 PROCEDURE — 43239 EGD BIOPSY SINGLE/MULTIPLE: CPT | Performed by: INTERNAL MEDICINE

## 2021-11-12 PROCEDURE — 88305 TISSUE EXAM BY PATHOLOGIST: CPT | Performed by: PATHOLOGY

## 2021-11-12 PROCEDURE — 88312 SPECIAL STAINS GROUP 1: CPT | Performed by: PATHOLOGY

## 2021-11-12 RX ORDER — AZATHIOPRINE 50 MG/1
1 TAB TABLET ORAL QD
Status: ON HOLD | COMMUNITY

## 2021-11-12 RX ORDER — MONTELUKAST 10 MG/1
1 TABLET TABLET, FILM COATED ORAL ONCE A DAY
Status: ACTIVE | COMMUNITY

## 2021-11-12 RX ORDER — PREDNISONE 10 MG/1
TAKE 2 TABLETS (40 MG) BY ORAL ROUTE ONCE DAILY TABLET ORAL 1
Refills: 0 | Status: ON HOLD | COMMUNITY
Start: 2019-04-26

## 2021-11-12 RX ORDER — TRAMADOL HYDROCHLORIDE 50 MG/1
1 TABLET AS NEEDED TABLET, FILM COATED ORAL QID
Status: ON HOLD | COMMUNITY

## 2021-11-12 RX ORDER — USTEKINUMAB 90 MG/ML
INJECT 90 MG SUBCUTANEOUSLY EVERY 8 WEEKS; BEGIN MAINTENANCE DOSING 8 WEEKS AFTER THE IV INDUCTION DOSE INJECTION, SOLUTION SUBCUTANEOUS
Qty: 1 | Refills: 6 | Status: ACTIVE | COMMUNITY

## 2021-11-12 RX ORDER — LAMOTRIGINE 150 MG/1
1 TABLET TABLET ORAL BID
Refills: 0 | Status: ON HOLD | COMMUNITY
Start: 1900-01-01

## 2021-11-12 RX ORDER — OMEPRAZOLE 20 MG/1
1 CAPSULE 30 MINUTES BEFORE BREAKFAST CAPSULE, DELAYED RELEASE ORAL ONCE A DAY
Qty: 90 | Refills: 3 | OUTPATIENT
Start: 2021-11-12

## 2021-11-12 RX ORDER — FLUTICASONE PROPIONATE 50 UG/1
SPRAY 1 SPRAY (50 MCG) IN EACH NOSTRIL BY INTRANASAL ROUTE ONCE DAILY SPRAY, METERED NASAL 1
Qty: 1 | Refills: 0 | Status: ON HOLD | COMMUNITY
Start: 1900-01-01

## 2021-11-12 RX ORDER — TIZANIDINE 4 MG/1
1 TABLET TABLET ORAL THREE TIMES A DAY
Status: ACTIVE | COMMUNITY

## 2021-11-12 RX ORDER — CYCLOBENZAPRINE HYDROCHLORIDE 10 MG/1
TAKE 1 TABLET (10 MG) BY ORAL ROUTE 2 TIMES PER DAY TABLET, FILM COATED ORAL 2
Qty: 0 | Refills: 0 | Status: ON HOLD | COMMUNITY
Start: 1900-01-01

## 2021-11-12 RX ORDER — SULFASALAZINE 500 MG/1
1 TABLET TABLET ORAL BID
Refills: 6 | Status: ON HOLD | COMMUNITY
Start: 2019-01-28

## 2021-11-12 RX ORDER — VARENICLINE TARTRATE 1 MG/1
TAKE 1 TABLET (1 MG) WITH GLASS OF WATER BY ORAL ROUTE 2 TIMES PER DAY AFTER MEALS TABLET, FILM COATED ORAL 2
Qty: 0 | Refills: 0 | Status: ON HOLD | COMMUNITY
Start: 1900-01-01

## 2021-11-12 RX ORDER — HYDROXYZINE HYDROCHLORIDE 50 MG/ML
1 ML AS NEEDED INJECTION, SOLUTION INTRAMUSCULAR
Status: ACTIVE | COMMUNITY

## 2021-11-12 RX ORDER — PANTOPRAZOLE SODIUM 40 MG/1
TAKE 1 TABLET (40 MG) BY ORAL ROUTE 2 TIMES PER DAY, 30 MINUTES BEFORE BREAKFAST AND DINNER TABLET, DELAYED RELEASE ORAL 2
Qty: 60 | Refills: 11 | Status: ON HOLD | COMMUNITY
Start: 2019-05-24

## 2021-11-12 RX ORDER — VENLAFAXINE HYDROCHLORIDE 75 MG/1
1 TABLET WITH FOOD TABLET ORAL ONCE A DAY
Status: ACTIVE | COMMUNITY

## 2021-11-12 RX ORDER — PAROXETINE HYDROCHLORIDE 30 MG/1
TAKE 1 TABLET (30 MG) BY ORAL ROUTE ONCE DAILY TABLET, FILM COATED ORAL 1
Qty: 0 | Refills: 0 | Status: ON HOLD | COMMUNITY
Start: 1900-01-01

## 2021-11-12 RX ORDER — PANTOPRAZOLE SODIUM 40 MG/1
TAKE 1 TABLET (40 MG) BY ORAL ROUTE 2 TIMES PER DAY, 30 MINUTES BEFORE BREAKFAST AND DINNER TABLET, DELAYED RELEASE ORAL 2
OUTPATIENT
Start: 2019-05-24

## 2021-11-12 RX ORDER — ONDANSETRON HYDROCHLORIDE 4 MG/1
1 TABLET TABLET, FILM COATED ORAL
Qty: 30 | Refills: 1 | Status: ON HOLD | COMMUNITY
Start: 2020-08-10

## 2023-05-18 NOTE — PHYSICAL EXAM EYES:
Conjuntivae and eyelids appear normal,  Sclerae : White without injection Banner Transposition Flap Text: The defect edges were debeveled with a #15 scalpel blade.  Given the location of the defect and the proximity to free margins a Banner transposition flap was deemed most appropriate.  Using a sterile surgical marker, an appropriate flap drawn around the defect. The area thus outlined was incised deep to adipose tissue with a #15 scalpel blade.  The skin margins were undermined to an appropriate distance in all directions utilizing iris scissors. Adjacent tissue was incised and carried over to close the defect.